# Patient Record
Sex: MALE | Race: WHITE | URBAN - METROPOLITAN AREA
[De-identification: names, ages, dates, MRNs, and addresses within clinical notes are randomized per-mention and may not be internally consistent; named-entity substitution may affect disease eponyms.]

---

## 2017-03-27 ENCOUNTER — OFFICE VISIT (OUTPATIENT)
Dept: FAMILY MEDICINE | Facility: CLINIC | Age: 47
End: 2017-03-27
Payer: OTHER MISCELLANEOUS

## 2017-03-27 VITALS
OXYGEN SATURATION: 99 % | SYSTOLIC BLOOD PRESSURE: 130 MMHG | HEART RATE: 97 BPM | DIASTOLIC BLOOD PRESSURE: 79 MMHG | TEMPERATURE: 98.6 F | BODY MASS INDEX: 24.49 KG/M2 | RESPIRATION RATE: 18 BRPM | WEIGHT: 147 LBS | HEIGHT: 65 IN

## 2017-03-27 DIAGNOSIS — S80.212A KNEE ABRASION, LEFT, INITIAL ENCOUNTER: Primary | ICD-10-CM

## 2017-03-27 PROCEDURE — 99202 OFFICE O/P NEW SF 15 MIN: CPT | Mod: 25 | Performed by: PHYSICIAN ASSISTANT

## 2017-03-27 PROCEDURE — 11042 DBRDMT SUBQ TIS 1ST 20SQCM/<: CPT | Performed by: PHYSICIAN ASSISTANT

## 2017-03-27 NOTE — PROGRESS NOTES
SUBJECTIVE:                                                    Arnav Rodríguez is a 46 year old male who presents to clinic today for the following health issues:      Work comp-fell hit left knee on ramp at work 03/27/17. Bleeding and swelling noted    Superficial avulsion to skin over his left knee. Not actively bleeding.     Problem list and histories reviewed & adjusted, as indicated.  Additional history: as documented        Reviewed and updated as needed this visit by clinical staff  Tobacco  Allergies  Meds  Med Hx  Surg Hx  Fam Hx  Soc Hx      Reviewed and updated as needed this visit by Provider         All other systems negative except as outline above  OBJECTIVE:  Knee rom normal. Wound cleansed.   3cmx3.5cm abrasion involving  Left knee. Wound anesthetized with 1 % lido with epi. 1cm piece of avulsed tissue was debrided/excized. with a 15 blade. Wound bandaged.    Arnav was seen today for work comp.    Diagnoses and all orders for this visit:    Knee abrasion, left, initial encounter      Discussed wound care. Will allow patient to rtw with out limitations .  Advised supportive and symptomatic treatment.  Follow up with Provider - if condition persists or worsens.

## 2017-03-27 NOTE — LETTER
[facility]  [address]  [place] [address]  Phone: [phone]    March 27, 2017        Arnav Rodríguez  [address] [place] [address]          To whom it may concern:    RE: Arnav Rodríguez    Patient was seen and treated at our clinic and may return to work 03/28/17 with the following:  No working or lifting restrictions    Please contact me for questions or concerns.      Sincerely,        Wisam Serrano PA-C

## 2017-03-27 NOTE — MR AVS SNAPSHOT
"              After Visit Summary   3/27/2017    Arnav Rodríguez    MRN: [id number]           Patient Information     Date Of Birth          1970        Visit Information        Provider Department      3/27/2017 4:00 PM Wisam Serrano PA-C [facility] [place]         Follow-ups after your visit        Who to contact     Normal or non-critical lab and imaging results will be communicated to you by MyChart, letter or phone within 4 business days after the clinic has received the results. If you do not hear from us within 7 days, please contact the clinic through MyChart or phone. If you have a critical or abnormal lab result, we will notify you by phone as soon as possible.  Submit refill requests through [employer] or call your pharmacy and they will forward the refill request to us. Please allow 3 business days for your refill to be completed.          If you need to speak with a  for additional information , please call: [phone]             Additional Information About Your Visit        [employer] Information     [employer] lets you send messages to your doctor, view your test results, renew your prescriptions, schedule appointments and more. To sign up, go to [email or web address] . Click on \"Log in\" on the left side of the screen, which will take you to the Welcome page. Then click on \"Sign up Now\" on the right side of the page.     You will be asked to enter the access code listed below, as well as some personal information. Please follow the directions to create your username and password.     Your access code is: [email or web address]  Expires: 2017  4:50 PM     Your access code will  in 90 days. If you need help or a new code, please call your [facility] clinic or [phone].        Care EveryWhere ID     This is your Care EveryWhere ID. This could be used by other organizations to access your [facility] medical records  [id number]        Your Vitals Were     Pulse Temperature " "Respirations Height Pulse Oximetry BMI (Body Mass Index)    97 98.6  F (37  C) (Tympanic) 18 5' 5\" (1.651 m) 99% 24.46 kg/m2       Blood Pressure from Last 3 Encounters:   03/27/17 130/79    Weight from Last 3 Encounters:   03/27/17 147 lb (66.7 kg)              Today, you had the following     No orders found for display       Primary Care Provider Office Phone #    Pam Taylor [facility] [phone]       No address on file        Thank you!     Thank you for choosing [facility]  for your care. Our goal is always to provide you with excellent care. Hearing back from our patients is one way we can continue to improve our services. Please take a few minutes to complete the written survey that you may receive in the mail after your visit with us. Thank you!             Your Updated Medication List - Protect others around you: Learn how to safely use, store and throw away your medicines at www.disposemymeds.org.      Notice  As of 3/27/2017  4:50 PM    You have not been prescribed any medications.      "

## 2017-03-30 ENCOUNTER — TELEPHONE (OUTPATIENT)
Dept: FAMILY MEDICINE | Facility: CLINIC | Age: 47
End: 2017-03-30

## 2017-03-30 NOTE — TELEPHONE ENCOUNTER
Physical Capacities Evaluation form received for completion,patient was seen on 03/27/17 for a knee laceration. To Kelly in basket.